# Patient Record
Sex: FEMALE | Race: WHITE | HISPANIC OR LATINO | Employment: OTHER | URBAN - METROPOLITAN AREA
[De-identification: names, ages, dates, MRNs, and addresses within clinical notes are randomized per-mention and may not be internally consistent; named-entity substitution may affect disease eponyms.]

---

## 2023-09-12 ENCOUNTER — HOSPITAL ENCOUNTER (OUTPATIENT)
Dept: DATA CONVERSION | Facility: HOSPITAL | Age: 68
End: 2023-09-12

## 2023-09-12 DIAGNOSIS — R07.9 CHEST PAIN, UNSPECIFIED: ICD-10-CM

## 2023-09-12 DIAGNOSIS — R06.02 SHORTNESS OF BREATH: ICD-10-CM

## 2023-10-17 ENCOUNTER — HOSPITAL ENCOUNTER (OUTPATIENT)
Dept: RADIOLOGY | Facility: HOSPITAL | Age: 68
Discharge: HOME | End: 2023-10-17
Payer: MEDICARE

## 2023-10-17 ENCOUNTER — HOSPITAL ENCOUNTER (OUTPATIENT)
Dept: CARDIOLOGY | Facility: HOSPITAL | Age: 68
Discharge: HOME | End: 2023-10-17
Payer: MEDICARE

## 2023-10-17 DIAGNOSIS — R06.00 DYSPNEA, UNSPECIFIED TYPE: ICD-10-CM

## 2023-10-17 DIAGNOSIS — R07.9 CHEST PAIN, UNSPECIFIED: ICD-10-CM

## 2023-10-17 DIAGNOSIS — R06.02 SHORTNESS OF BREATH: ICD-10-CM

## 2023-10-17 PROCEDURE — 93306 TTE W/DOPPLER COMPLETE: CPT | Performed by: INTERNAL MEDICINE

## 2023-10-17 PROCEDURE — 93306 TTE W/DOPPLER COMPLETE: CPT

## 2023-10-17 PROCEDURE — 75571 CT HRT W/O DYE W/CA TEST: CPT

## 2023-10-18 LAB — EJECTION FRACTION APICAL 4 CHAMBER: 67.7

## 2023-11-14 ENCOUNTER — OFFICE VISIT (OUTPATIENT)
Dept: CARDIOLOGY | Facility: CLINIC | Age: 68
End: 2023-11-14
Payer: MEDICARE

## 2023-11-14 VITALS
TEMPERATURE: 98.6 F | BODY MASS INDEX: 26.52 KG/M2 | DIASTOLIC BLOOD PRESSURE: 67 MMHG | WEIGHT: 165 LBS | SYSTOLIC BLOOD PRESSURE: 117 MMHG | RESPIRATION RATE: 18 BRPM | HEART RATE: 64 BPM | HEIGHT: 66 IN | OXYGEN SATURATION: 98 %

## 2023-11-14 DIAGNOSIS — E78.2 HYPERLIPEMIA, MIXED: ICD-10-CM

## 2023-11-14 DIAGNOSIS — R06.02 SOB (SHORTNESS OF BREATH): Primary | ICD-10-CM

## 2023-11-14 DIAGNOSIS — I10 HTN (HYPERTENSION): ICD-10-CM

## 2023-11-14 DIAGNOSIS — J44.9 COPD (CHRONIC OBSTRUCTIVE PULMONARY DISEASE) (MULTI): ICD-10-CM

## 2023-11-14 PROBLEM — R06.00 DYSPNEA: Status: ACTIVE | Noted: 2023-11-14

## 2023-11-14 PROBLEM — R07.9 CHEST PAIN: Status: ACTIVE | Noted: 2023-11-14

## 2023-11-14 PROBLEM — E78.5 HYPERLIPIDEMIA: Status: ACTIVE | Noted: 2023-11-14

## 2023-11-14 PROCEDURE — 3074F SYST BP LT 130 MM HG: CPT | Performed by: INTERNAL MEDICINE

## 2023-11-14 PROCEDURE — 1036F TOBACCO NON-USER: CPT | Performed by: INTERNAL MEDICINE

## 2023-11-14 PROCEDURE — 3078F DIAST BP <80 MM HG: CPT | Performed by: INTERNAL MEDICINE

## 2023-11-14 PROCEDURE — 1126F AMNT PAIN NOTED NONE PRSNT: CPT | Performed by: INTERNAL MEDICINE

## 2023-11-14 PROCEDURE — 99214 OFFICE O/P EST MOD 30 MIN: CPT | Performed by: INTERNAL MEDICINE

## 2023-11-14 PROCEDURE — 1159F MED LIST DOCD IN RCRD: CPT | Performed by: INTERNAL MEDICINE

## 2023-11-14 RX ORDER — METOPROLOL SUCCINATE 25 MG/1
25 TABLET, EXTENDED RELEASE ORAL DAILY
Qty: 90 TABLET | Refills: 3 | Status: SHIPPED | OUTPATIENT
Start: 2023-11-14 | End: 2024-11-13

## 2023-11-14 RX ORDER — ISOSORBIDE MONONITRATE 30 MG/1
30 TABLET, EXTENDED RELEASE ORAL DAILY
Qty: 90 TABLET | Refills: 3 | Status: SHIPPED | OUTPATIENT
Start: 2023-11-14 | End: 2024-11-13

## 2023-11-14 RX ORDER — ISOSORBIDE MONONITRATE 30 MG/1
30 TABLET, EXTENDED RELEASE ORAL DAILY
COMMUNITY
End: 2023-11-14 | Stop reason: SDUPTHER

## 2023-11-14 RX ORDER — ROSUVASTATIN CALCIUM 20 MG/1
20 TABLET, COATED ORAL DAILY
COMMUNITY
End: 2023-11-14 | Stop reason: SDUPTHER

## 2023-11-14 RX ORDER — METOPROLOL SUCCINATE 25 MG/1
25 TABLET, EXTENDED RELEASE ORAL DAILY
COMMUNITY
End: 2023-11-14 | Stop reason: SDUPTHER

## 2023-11-14 RX ORDER — NITROFURANTOIN 25; 75 MG/1; MG/1
100 CAPSULE ORAL 2 TIMES DAILY
COMMUNITY
Start: 2023-11-10 | End: 2023-11-15

## 2023-11-14 RX ORDER — PHENAZOPYRIDINE HYDROCHLORIDE 200 MG/1
200 TABLET, FILM COATED ORAL 2 TIMES DAILY PRN
COMMUNITY
Start: 2023-11-10

## 2023-11-14 RX ORDER — IBUPROFEN 600 MG/1
600 TABLET ORAL EVERY 8 HOURS PRN
COMMUNITY
Start: 2023-10-10

## 2023-11-14 RX ORDER — ISOSORBIDE MONONITRATE 30 MG/1
30 TABLET, EXTENDED RELEASE ORAL DAILY
COMMUNITY
Start: 2023-08-22 | End: 2023-11-14 | Stop reason: SDUPTHER

## 2023-11-14 RX ORDER — ROSUVASTATIN CALCIUM 20 MG/1
20 TABLET, COATED ORAL DAILY
Qty: 90 TABLET | Refills: 3 | Status: SHIPPED | OUTPATIENT
Start: 2023-11-14 | End: 2024-11-13

## 2023-11-14 ASSESSMENT — PATIENT HEALTH QUESTIONNAIRE - PHQ9
2. FEELING DOWN, DEPRESSED OR HOPELESS: NOT AT ALL
1. LITTLE INTEREST OR PLEASURE IN DOING THINGS: NOT AT ALL
SUM OF ALL RESPONSES TO PHQ9 QUESTIONS 1 AND 2: 0

## 2023-11-14 ASSESSMENT — PAIN SCALES - GENERAL: PAINLEVEL: 0-NO PAIN

## 2023-11-14 NOTE — PROGRESS NOTES
History of present illness:  Patient returns to my office for follow-up.  He still complaining of shortness of breath with moderate activity.  Denies any chest pain dizziness or syncope.      Past Medical History:   Diagnosis Date    Chest pain 11/14/2023    Hyperlipidemia 11/14/2023       No past surgical history on file.    Not on File         No family history on file.    Patient's Medications   New Prescriptions    No medications on file   Previous Medications    IBUPROFEN 600 MG TABLET    Take 1 tablet (600 mg) by mouth every 8 hours if needed for moderate pain (4 - 6).    ISOSORBIDE MONONITRATE ER (IMDUR) 30 MG 24 HR TABLET    Take 1 tablet (30 mg) by mouth once daily.    METOPROLOL SUCCINATE XL (TOPROL-XL) 25 MG 24 HR TABLET    Take 1 tablet (25 mg) by mouth once daily.    NITROFURANTOIN, MACROCRYSTAL-MONOHYDRATE, (MACROBID) 100 MG CAPSULE    Take 1 capsule (100 mg) by mouth 2 times a day.    PHENAZOPYRIDINE (PYRIDIUM) 200 MG TABLET    Take 1 tablet (200 mg) by mouth 2 times a day as needed (Pain).    ROSUVASTATIN (CRESTOR) 20 MG TABLET    Take 1 tablet (20 mg) by mouth once daily.   Modified Medications    No medications on file   Discontinued Medications    ISOSORBIDE MONONITRATE ER (IMDUR) 30 MG 24 HR TABLET    Take 1 tablet (30 mg) by mouth once daily.    METOPROLOL SUCCINATE XL (TOPROL-XL) 25 MG 24 HR TABLET    Take 1 tablet (25 mg) by mouth once daily.    ROSUVASTATIN (CRESTOR) 20 MG TABLET    Take 1 tablet (20 mg) by mouth once daily.       Objective   Physical Exam  General: Patient in no acute distress   HEENT: Atraumatic normocephalic.  Neck: Supple, jugular venous pressure within normal limit.  No bruits  Lungs: Clear to auscultation bilaterally  Cardiovascular: Regular rate and rhythm, normal heart sounds, no murmurs rubs or gallops  Abdomen: Soft nontender nondistended.  Normal bowel sounds.  Extremities: Warm to touch, no edema.    Lab Review   Hospital Outpatient Visit on 10/17/2023   Component  Date Value    LV A4C EF 10/17/2023 67.7         Assessment/Plan   Patient Active Problem List   Diagnosis    Chest pain    Dyspnea    Hyperlipidemia      This is a very pleasant 68-year-old female moved to [place] recently. Used to live in [place]. She was diagnosed of heart failure according to her. She told me that her cardiologist told her that her heart is stiff. She underwent echo and stress test 2 years ago in [place] I do not have the results of this testing. Patient has been feeling overall okay. She still complaining of shortness of breath with moderate activity. Reports chest tightness also with moderate activity. Denies having dizziness or lightheadedness. Denies having syncope or sustained palpitations. She did have palpitations in the past and she felt better with beta-blockers. At this point I will arrange for 2D echo to assess patient's ejection fraction and will obtain also coronary calcium score to see if she has significant buildup of calcium in her arteries. Meanwhile we will switch her from simvastatin to rosuvastatin 20 mg oral daily we will continue metoprolol. We will stop fenofibrate and obtain lipid panel. We will also start on isosorbide and reassess.    Patient returns to my office for follow-up.  He still complaining of shortness of breath with moderate activity.  Denies any chest pain dizziness or syncope.  Underwent 2D echo that showed normal ejection fraction no major valve abnormalities.  She also underwent coronary calcium score which showed 0 calcium score.  On her physical examination she still have expiratory wheezing.  Will refer patient for pulmonary evaluation at the [facility].  Otherwise we will follow-up in 6 months.  Tolerating her current medications.    Kecia Wall MD

## 2023-11-27 PROBLEM — I10 HTN (HYPERTENSION): Status: ACTIVE | Noted: 2023-11-27

## 2023-11-27 PROBLEM — E78.2 HYPERLIPEMIA, MIXED: Status: ACTIVE | Noted: 2023-11-14

## 2024-04-17 ENCOUNTER — TELEPHONE (OUTPATIENT)
Dept: CARDIOLOGY | Facility: CLINIC | Age: 69
End: 2024-04-17
Payer: MEDICARE

## 2024-05-14 ENCOUNTER — APPOINTMENT (OUTPATIENT)
Dept: CARDIOLOGY | Facility: CLINIC | Age: 69
End: 2024-05-14
Payer: MEDICARE

## 2024-06-24 ENCOUNTER — APPOINTMENT (OUTPATIENT)
Dept: CARDIOLOGY | Facility: CLINIC | Age: 69
End: 2024-06-24
Payer: MEDICARE

## 2024-07-10 ENCOUNTER — APPOINTMENT (OUTPATIENT)
Dept: CARDIOLOGY | Facility: CLINIC | Age: 69
End: 2024-07-10
Payer: MEDICARE

## 2024-07-10 VITALS
SYSTOLIC BLOOD PRESSURE: 125 MMHG | TEMPERATURE: 98.9 F | WEIGHT: 173 LBS | DIASTOLIC BLOOD PRESSURE: 66 MMHG | HEART RATE: 56 BPM | BODY MASS INDEX: 27.8 KG/M2 | HEIGHT: 66 IN | OXYGEN SATURATION: 96 % | RESPIRATION RATE: 18 BRPM

## 2024-07-10 DIAGNOSIS — I10 PRIMARY HYPERTENSION: ICD-10-CM

## 2024-07-10 DIAGNOSIS — E78.2 HYPERLIPEMIA, MIXED: Primary | ICD-10-CM

## 2024-07-10 DIAGNOSIS — R07.2 PRECORDIAL PAIN: ICD-10-CM

## 2024-07-10 PROCEDURE — 1159F MED LIST DOCD IN RCRD: CPT | Performed by: INTERNAL MEDICINE

## 2024-07-10 PROCEDURE — 1126F AMNT PAIN NOTED NONE PRSNT: CPT | Performed by: INTERNAL MEDICINE

## 2024-07-10 PROCEDURE — 3078F DIAST BP <80 MM HG: CPT | Performed by: INTERNAL MEDICINE

## 2024-07-10 PROCEDURE — 1036F TOBACCO NON-USER: CPT | Performed by: INTERNAL MEDICINE

## 2024-07-10 PROCEDURE — 1160F RVW MEDS BY RX/DR IN RCRD: CPT | Performed by: INTERNAL MEDICINE

## 2024-07-10 PROCEDURE — 99214 OFFICE O/P EST MOD 30 MIN: CPT | Performed by: INTERNAL MEDICINE

## 2024-07-10 PROCEDURE — 3074F SYST BP LT 130 MM HG: CPT | Performed by: INTERNAL MEDICINE

## 2024-07-10 ASSESSMENT — LIFESTYLE VARIABLES
HOW MANY STANDARD DRINKS CONTAINING ALCOHOL DO YOU HAVE ON A TYPICAL DAY: PATIENT DOES NOT DRINK
HAVE YOU OR SOMEONE ELSE BEEN INJURED AS A RESULT OF YOUR DRINKING: NO
HOW OFTEN DO YOU HAVE SIX OR MORE DRINKS ON ONE OCCASION: NEVER
HAS A RELATIVE, FRIEND, DOCTOR, OR ANOTHER HEALTH PROFESSIONAL EXPRESSED CONCERN ABOUT YOUR DRINKING OR SUGGESTED YOU CUT DOWN: NO
SKIP TO QUESTIONS 9-10: 1
AUDIT TOTAL SCORE: 0
HOW OFTEN DO YOU HAVE A DRINK CONTAINING ALCOHOL: NEVER
AUDIT-C TOTAL SCORE: 0

## 2024-07-10 ASSESSMENT — PATIENT HEALTH QUESTIONNAIRE - PHQ9
SUM OF ALL RESPONSES TO PHQ9 QUESTIONS 1 AND 2: 0
2. FEELING DOWN, DEPRESSED OR HOPELESS: NOT AT ALL
1. LITTLE INTEREST OR PLEASURE IN DOING THINGS: NOT AT ALL

## 2024-07-10 ASSESSMENT — ENCOUNTER SYMPTOMS
DEPRESSION: 0
LOSS OF SENSATION IN FEET: 0
OCCASIONAL FEELINGS OF UNSTEADINESS: 0

## 2024-07-10 ASSESSMENT — PAIN SCALES - GENERAL: PAINLEVEL: 0-NO PAIN

## 2024-07-10 NOTE — PROGRESS NOTES
History of present illness:  This is a very pleasant 68-year-old female moved to [place] recently. Used to live in [place]. She was diagnosed of heart failure according to her. She told me that her cardiologist told her that her heart is stiff. She underwent echo and stress test 2 years ago in [place] I do not have the results of this testing. Patient has been feeling overall okay.  Patient had a recent calcium score of 0.  Feeling much better.  Denies having chest pain shortness of breath palpitations or dizziness.    Past Medical History:   Diagnosis Date    Chest pain 11/14/2023    Hyperlipidemia 11/14/2023       History reviewed. No pertinent surgical history.    No Known Allergies     reports that she has never smoked. She has never been exposed to tobacco smoke. She has never used smokeless tobacco. She reports that she does not currently use alcohol. She reports that she does not use drugs.    Family History   Problem Relation Name Age of Onset    Diabetes Mother      Heart disease Father      Diabetes Other          SEVERAL BROTHERS AND SISTERS       Patient's Medications   New Prescriptions    No medications on file   Previous Medications    IBUPROFEN 600 MG TABLET    Take 1 tablet (600 mg) by mouth every 8 hours if needed for moderate pain (4 - 6).    ISOSORBIDE MONONITRATE ER (IMDUR) 30 MG 24 HR TABLET    Take 1 tablet (30 mg) by mouth once daily.    METOPROLOL SUCCINATE XL (TOPROL-XL) 25 MG 24 HR TABLET    Take 1 tablet (25 mg) by mouth once daily.    PHENAZOPYRIDINE (PYRIDIUM) 200 MG TABLET    Take 1 tablet (200 mg) by mouth 2 times a day as needed (Pain).    ROSUVASTATIN (CRESTOR) 20 MG TABLET    Take 1 tablet (20 mg) by mouth once daily.   Modified Medications    No medications on file   Discontinued Medications    No medications on file       Objective   Physical Exam  General: Patient in no acute distress   HEENT: Atraumatic normocephalic.  Neck: Supple, jugular venous pressure within  normal limit.  No bruits  Lungs: Clear to auscultation bilaterally  Cardiovascular: Regular rate and rhythm, normal heart sounds, no murmurs rubs or gallops  Abdomen: Soft nontender nondistended.  Normal bowel sounds.  Extremities: Warm to touch, no edema.      Lab Review   No visits with results within 2 Month(s) from this visit.   Latest known visit with results is:   Hospital Outpatient Visit on 10/17/2023   Component Date Value    LV A4C EF 10/17/2023 67.7         Assessment/Plan   Patient Active Problem List   Diagnosis    Chest pain    Dyspnea    Hyperlipemia, mixed    HTN (hypertension)        This is a very pleasant 68-year-old female moved to [place] recently. Used to live in [place]. She was diagnosed of heart failure according to her. She told me that her cardiologist told her that her heart is stiff. She underwent echo and stress test 2 years ago in [place] I do not have the results of this testing. Patient has been feeling overall okay.  Patient had a recent calcium score of 0.  Feeling much better.  Denies having chest pain shortness of breath palpitations or dizziness.  She does have lower extremity edema that has been well-controlled with compression stocking likely secondary to venous stasis.  She stable from my standpoint.  Recommend to monitor for now reviewed her labs LDL at target as well as her renal function stable continue current medications we will follow-up in a year     Kecia Wall MD

## 2024-11-19 ENCOUNTER — TELEPHONE (OUTPATIENT)
Dept: CARDIOLOGY | Facility: CLINIC | Age: 69
End: 2024-11-19
Payer: MEDICARE

## 2024-11-19 NOTE — TELEPHONE ENCOUNTER
Patient was [facility] for low heart rate they were recommending her get a heart monitor for low heart would you like me to register her for a zoll monitor?

## 2024-11-21 ENCOUNTER — APPOINTMENT (OUTPATIENT)
Dept: RADIOLOGY | Facility: HOSPITAL | Age: 69
End: 2024-11-21
Payer: MEDICARE

## 2024-11-21 ENCOUNTER — APPOINTMENT (OUTPATIENT)
Dept: CARDIOLOGY | Facility: HOSPITAL | Age: 69
End: 2024-11-21
Payer: MEDICARE

## 2024-11-21 ENCOUNTER — HOSPITAL ENCOUNTER (EMERGENCY)
Facility: HOSPITAL | Age: 69
Discharge: HOME | End: 2024-11-21
Attending: EMERGENCY MEDICINE
Payer: MEDICARE

## 2024-11-21 VITALS
WEIGHT: 174.16 LBS | TEMPERATURE: 97.9 F | DIASTOLIC BLOOD PRESSURE: 75 MMHG | RESPIRATION RATE: 16 BRPM | OXYGEN SATURATION: 95 % | SYSTOLIC BLOOD PRESSURE: 128 MMHG | BODY MASS INDEX: 25.8 KG/M2 | HEIGHT: 69 IN | HEART RATE: 59 BPM

## 2024-11-21 DIAGNOSIS — R53.1 GENERALIZED WEAKNESS: Primary | ICD-10-CM

## 2024-11-21 LAB
ALBUMIN SERPL BCP-MCNC: 4.7 G/DL (ref 3.4–5)
ALP SERPL-CCNC: 76 U/L (ref 33–136)
ALT SERPL W P-5'-P-CCNC: 22 U/L (ref 7–45)
ANION GAP SERPL CALCULATED.3IONS-SCNC: 10 MMOL/L (ref 10–20)
APPEARANCE UR: CLEAR
AST SERPL W P-5'-P-CCNC: 33 U/L (ref 9–39)
ATRIAL RATE: 58 BPM
BASOPHILS # BLD AUTO: 0.02 X10*3/UL (ref 0–0.1)
BASOPHILS NFR BLD AUTO: 0.3 %
BILIRUB SERPL-MCNC: 1.2 MG/DL (ref 0–1.2)
BILIRUB UR STRIP.AUTO-MCNC: NEGATIVE MG/DL
BUN SERPL-MCNC: 13 MG/DL (ref 6–23)
CALCIUM SERPL-MCNC: 10.5 MG/DL (ref 8.6–10.3)
CARDIAC TROPONIN I PNL SERPL HS: 3 NG/L (ref 0–13)
CARDIAC TROPONIN I PNL SERPL HS: <3 NG/L (ref 0–13)
CHLORIDE SERPL-SCNC: 102 MMOL/L (ref 98–107)
CO2 SERPL-SCNC: 29 MMOL/L (ref 21–32)
COLOR UR: COLORLESS
CREAT SERPL-MCNC: 0.81 MG/DL (ref 0.5–1.05)
EGFRCR SERPLBLD CKD-EPI 2021: 78 ML/MIN/1.73M*2
EOSINOPHIL # BLD AUTO: 0.16 X10*3/UL (ref 0–0.4)
EOSINOPHIL NFR BLD AUTO: 2 %
ERYTHROCYTE [DISTWIDTH] IN BLOOD BY AUTOMATED COUNT: 13.2 % (ref 11.5–14.5)
FLUAV RNA RESP QL NAA+PROBE: NOT DETECTED
FLUBV RNA RESP QL NAA+PROBE: NOT DETECTED
GLUCOSE SERPL-MCNC: 87 MG/DL (ref 74–99)
GLUCOSE UR STRIP.AUTO-MCNC: NORMAL MG/DL
HCT VFR BLD AUTO: 46.2 % (ref 36–46)
HGB BLD-MCNC: 15.3 G/DL (ref 12–16)
IMM GRANULOCYTES # BLD AUTO: 0.01 X10*3/UL (ref 0–0.5)
IMM GRANULOCYTES NFR BLD AUTO: 0.1 % (ref 0–0.9)
KETONES UR STRIP.AUTO-MCNC: NEGATIVE MG/DL
LEUKOCYTE ESTERASE UR QL STRIP.AUTO: NEGATIVE
LYMPHOCYTES # BLD AUTO: 3.16 X10*3/UL (ref 0.8–3)
LYMPHOCYTES NFR BLD AUTO: 40 %
MCH RBC QN AUTO: 29 PG (ref 26–34)
MCHC RBC AUTO-ENTMCNC: 33.1 G/DL (ref 32–36)
MCV RBC AUTO: 88 FL (ref 80–100)
MONOCYTES # BLD AUTO: 0.76 X10*3/UL (ref 0.05–0.8)
MONOCYTES NFR BLD AUTO: 9.6 %
NEUTROPHILS # BLD AUTO: 3.79 X10*3/UL (ref 1.6–5.5)
NEUTROPHILS NFR BLD AUTO: 48 %
NITRITE UR QL STRIP.AUTO: NEGATIVE
NRBC BLD-RTO: 0 /100 WBCS (ref 0–0)
P AXIS: -1 DEGREES
P OFFSET: 203 MS
P ONSET: 152 MS
PH UR STRIP.AUTO: 6.5 [PH]
PLATELET # BLD AUTO: 293 X10*3/UL (ref 150–450)
POTASSIUM SERPL-SCNC: 4.4 MMOL/L (ref 3.5–5.3)
PR INTERVAL: 146 MS
PROT SERPL-MCNC: 8.2 G/DL (ref 6.4–8.2)
PROT UR STRIP.AUTO-MCNC: NEGATIVE MG/DL
Q ONSET: 225 MS
QRS COUNT: 10 BEATS
QRS DURATION: 78 MS
QT INTERVAL: 426 MS
QTC CALCULATION(BAZETT): 418 MS
QTC FREDERICIA: 421 MS
R AXIS: -8 DEGREES
RBC # BLD AUTO: 5.28 X10*6/UL (ref 4–5.2)
RBC # UR STRIP.AUTO: NEGATIVE /UL
SARS-COV-2 RNA RESP QL NAA+PROBE: NOT DETECTED
SODIUM SERPL-SCNC: 137 MMOL/L (ref 136–145)
SP GR UR STRIP.AUTO: 1
T AXIS: 26 DEGREES
T OFFSET: 438 MS
UROBILINOGEN UR STRIP.AUTO-MCNC: NORMAL MG/DL
VENTRICULAR RATE: 58 BPM
WBC # BLD AUTO: 7.9 X10*3/UL (ref 4.4–11.3)

## 2024-11-21 PROCEDURE — 84484 ASSAY OF TROPONIN QUANT: CPT

## 2024-11-21 PROCEDURE — 99285 EMERGENCY DEPT VISIT HI MDM: CPT | Mod: 25

## 2024-11-21 PROCEDURE — 70450 CT HEAD/BRAIN W/O DYE: CPT | Performed by: RADIOLOGY

## 2024-11-21 PROCEDURE — 85025 COMPLETE CBC W/AUTO DIFF WBC: CPT

## 2024-11-21 PROCEDURE — 87636 SARSCOV2 & INF A&B AMP PRB: CPT

## 2024-11-21 PROCEDURE — 36415 COLL VENOUS BLD VENIPUNCTURE: CPT

## 2024-11-21 PROCEDURE — 71046 X-RAY EXAM CHEST 2 VIEWS: CPT | Performed by: RADIOLOGY

## 2024-11-21 PROCEDURE — 81003 URINALYSIS AUTO W/O SCOPE: CPT

## 2024-11-21 PROCEDURE — 93005 ELECTROCARDIOGRAM TRACING: CPT

## 2024-11-21 PROCEDURE — 80053 COMPREHEN METABOLIC PANEL: CPT

## 2024-11-21 PROCEDURE — 71046 X-RAY EXAM CHEST 2 VIEWS: CPT

## 2024-11-21 PROCEDURE — 70450 CT HEAD/BRAIN W/O DYE: CPT

## 2024-11-21 ASSESSMENT — COLUMBIA-SUICIDE SEVERITY RATING SCALE - C-SSRS
6. HAVE YOU EVER DONE ANYTHING, STARTED TO DO ANYTHING, OR PREPARED TO DO ANYTHING TO END YOUR LIFE?: NO
2. HAVE YOU ACTUALLY HAD ANY THOUGHTS OF KILLING YOURSELF?: NO
1. IN THE PAST MONTH, HAVE YOU WISHED YOU WERE DEAD OR WISHED YOU COULD GO TO SLEEP AND NOT WAKE UP?: NO

## 2024-11-21 ASSESSMENT — PAIN SCALES - GENERAL: PAINLEVEL_OUTOF10: 0 - NO PAIN

## 2024-11-21 ASSESSMENT — PAIN - FUNCTIONAL ASSESSMENT: PAIN_FUNCTIONAL_ASSESSMENT: 0-10

## 2024-11-21 NOTE — ED PROVIDER NOTES
HPI   Chief Complaint   Patient presents with    Weakness, Gen     Pt came in for feeling weak starting this morning and was seen at [facility] for two syncopal episodes Saturday, was on metoprolol but was taken off due to her syncope and low bp Saturday, denies chest pain sob, no other significant medical hx        Patient is a 69-year-old female with past medical history of HTN, HLD presenting with concerns for weakness.  Report is that she was in the hospital from Saturday until Tuesday due to recurrent syncopal episodes.  An extensive workup was done and [facility] states that the likely cause is patient's metoprolol.  She has been discontinued from the metoprolol.  Family at bedside states that patient has just appeared more fatigued but has not had any syncopal episodes or falls since the hospitalization.  Patient denies fevers, chills, cough, sore throat, runny nose, chest pain, shortness of breath, abdominal pain, nausea, vomiting, diarrhea or urinary complaints.              Patient History   Past Medical History:   Diagnosis Date    Chest pain 11/14/2023    Hyperlipidemia 11/14/2023     No past surgical history on file.  Family History   Problem Relation Name Age of Onset    Diabetes Mother      Heart disease Father      Diabetes Other          SEVERAL BROTHERS AND SISTERS     Social History     Tobacco Use    Smoking status: Never     Passive exposure: Never    Smokeless tobacco: Never   Substance Use Topics    Alcohol use: Not Currently    Drug use: Never       Physical Exam   ED Triage Vitals [11/21/24 1229]   Temperature Heart Rate Respirations BP   36.6 °C (97.9 °F) 59 19 141/73      Pulse Ox Temp Source Heart Rate Source Patient Position   95 % Temporal Monitor --      BP Location FiO2 (%)     -- --       Physical Exam  Vitals and nursing note reviewed.   Constitutional:       Appearance: She is well-developed.      Comments: Awake, laying in examination bed   HENT:      Head: Normocephalic and atraumatic.       Nose: Nose normal.      Mouth/Throat:      Mouth: Mucous membranes are moist.      Pharynx: Oropharynx is clear.   Eyes:      Extraocular Movements: Extraocular movements intact.      Conjunctiva/sclera: Conjunctivae normal.      Pupils: Pupils are equal, round, and reactive to light.   Cardiovascular:      Rate and Rhythm: Normal rate and regular rhythm.      Pulses: Normal pulses.      Heart sounds: Normal heart sounds. No murmur heard.  Pulmonary:      Effort: Pulmonary effort is normal. No respiratory distress.      Breath sounds: Normal breath sounds.   Abdominal:      General: Abdomen is flat.      Palpations: Abdomen is soft.      Tenderness: There is no abdominal tenderness.   Musculoskeletal:         General: No swelling. Normal range of motion.      Cervical back: Normal range of motion and neck supple.   Skin:     General: Skin is warm and dry.      Capillary Refill: Capillary refill takes less than 2 seconds.   Neurological:      General: No focal deficit present.      Mental Status: She is alert and oriented to person, place, and time.      Comments: Awake, alert and oriented x 3. Power intact in the upper and lower extremities. Sensation is intact to light touch in the upper and lower extremities. Cranial Nerves 2-12 are intact. Patella DTRs intact. Finger-to-nose intact. No truncal ataxia.   Psychiatric:         Mood and Affect: Mood normal.         Behavior: Behavior normal.           ED Course & MDM   Diagnoses as of 11/22/24 1057   Generalized weakness                 No data recorded     Jason Coma Scale Score: 15 (11/21/24 1232 : Abdullahi Raphael, RN)                           Medical Decision Making  Patient is a 69-year-old female with past medical history of HTN, HLD presenting with concerns for weakness.  Lab work, urine, imaging ordered.  Condition considered include but are not limited: Pneumonia, viral illness, anemia, UTI.    I saw this patient in conjunction with Dr. Castro.  CBC is  without leukocytosis or anemia.  CMP without significant electrolyte abnormality or renal impairment.  UA with micro is without infection.  Viral swabs are negative.  Troponin within normal limits.  Chest x-ray without acute cardiopulmonary process.  Head CT without acute findings.  Patient successfully passed trial ambulation.    I believe this patient is at low risk for complication, and a disposition of discharge is acceptable.  Return to the Emergency Department if new or worsening symptoms including headache, fever, chills, chest pain, shortness of breath, syncope, near syncope, abdominal pain, nausea, vomiting,  diarrhea, or worsening pain.  Educated patient to utilize the walker she has at home until she feels more confident.  Encouraged her to follow with primary care in the next several days.  Patient is agreeable to this.  Did discuss that her fatigue could be related to the fact that she was in the hospital for several days and it requires multiple days out of the hospital to fully recover.    Portions of this note made with Dragon software, please be mindful of potential grammatical errors.        Labs Reviewed   CBC WITH AUTO DIFFERENTIAL - Abnormal       Result Value    WBC 7.9      nRBC 0.0      RBC 5.28 (*)     Hemoglobin 15.3      Hematocrit 46.2 (*)     MCV 88      MCH 29.0      MCHC 33.1      RDW 13.2      Platelets 293      Neutrophils % 48.0      Immature Granulocytes %, Automated 0.1      Lymphocytes % 40.0      Monocytes % 9.6      Eosinophils % 2.0      Basophils % 0.3      Neutrophils Absolute 3.79      Immature Granulocytes Absolute, Automated 0.01      Lymphocytes Absolute 3.16 (*)     Monocytes Absolute 0.76      Eosinophils Absolute 0.16      Basophils Absolute 0.02     COMPREHENSIVE METABOLIC PANEL - Abnormal    Glucose 87      Sodium 137      Potassium 4.4      Chloride 102      Bicarbonate 29      Anion Gap 10      Urea Nitrogen 13      Creatinine 0.81      eGFR 78      Calcium 10.5  (*)     Albumin 4.7      Alkaline Phosphatase 76      Total Protein 8.2      AST 33      Bilirubin, Total 1.2      ALT 22     URINALYSIS WITH REFLEX CULTURE AND MICROSCOPIC - Abnormal    Color, Urine Colorless (*)     Appearance, Urine Clear      Specific Gravity, Urine 1.005      pH, Urine 6.5      Protein, Urine NEGATIVE      Glucose, Urine Normal      Blood, Urine NEGATIVE      Ketones, Urine NEGATIVE      Bilirubin, Urine NEGATIVE      Urobilinogen, Urine Normal      Nitrite, Urine NEGATIVE      Leukocyte Esterase, Urine NEGATIVE     SARS-COV-2 PCR - Normal    Coronavirus 2019, PCR Not Detected      Narrative:     This assay has received FDA Emergency Use Authorization (EUA) and is only authorized for the duration of time that circumstances exist to justify the authorization of the emergency use of in vitro diagnostic tests for the detection of SARS-CoV-2 virus and/or diagnosis of COVID-19 infection under section 564(b)(1) of the Act, 21 U.S.C. 360bbb-3(b)(1). This assay is an in vitro diagnostic nucleic acid amplification test for the qualitative detection of SARS-CoV-2 from nasopharyngeal specimens and has been validated for use at [facility]. Negative results do not preclude COVID-19 infections and should not be used as the sole basis for diagnosis, treatment, or other management decisions.     INFLUENZA A AND B PCR - Normal    Flu A Result Not Detected      Flu B Result Not Detected      Narrative:     This assay is an in vitro diagnostic multiplex nucleic acid amplification test for the detection and discrimination of Influenza A & B from nasopharyngeal specimens, and has been validated for use at [facility]. Negative results do not preclude Influenza A/B infections, and should not be used as the sole basis for diagnosis, treatment, or other management decisions. If Influenza A/B and RSV PCR results are negative, testing for Parainfluenza virus, Adenovirus and  Metapneumovirus is routinely performed for [facility] pediatric oncology and intensive care inpatients, and is available on other patients by placing an add-on request.   SERIAL TROPONIN-INITIAL - Normal    Troponin I, High Sensitivity 3      Narrative:     Less than 99th percentile of normal range cutoff-  Female and children under 18 years old <14 ng/L; Male <21 ng/L: Negative  Repeat testing should be performed if clinically indicated.     Female and children under 18 years old 14-50 ng/L; Male 21-50 ng/L:  Consistent with possible cardiac damage and possible increased clinical   risk. Serial measurements may help to assess extent of myocardial damage.     >50 ng/L: Consistent with cardiac damage, increased clinical risk and  myocardial infarction. Serial measurements may help assess extent of   myocardial damage.      NOTE: Children less than 1 year old may have higher baseline troponin   levels and results should be interpreted in conjunction with the overall   clinical context.     NOTE: Troponin I testing is performed using a different   testing methodology at [facility] than at other   [facility]. Direct result comparisons should only   be made within the same method.   SERIAL TROPONIN, 1 HOUR - Normal    Troponin I, High Sensitivity <3      Narrative:     Less than 99th percentile of normal range cutoff-  Female and children under 18 years old <14 ng/L; Male <21 ng/L: Negative  Repeat testing should be performed if clinically indicated.     Female and children under 18 years old 14-50 ng/L; Male 21-50 ng/L:  Consistent with possible cardiac damage and possible increased clinical   risk. Serial measurements may help to assess extent of myocardial damage.     >50 ng/L: Consistent with cardiac damage, increased clinical risk and  myocardial infarction. Serial measurements may help assess extent of   myocardial damage.      NOTE: Children less than 1 year old may have higher baseline troponin    levels and results should be interpreted in conjunction with the overall   clinical context.     NOTE: Troponin I testing is performed using a different   testing methodology at [facility] than at other   [facility]. Direct result comparisons should only   be made within the same method.   TROPONIN SERIES- (INITIAL, 1 HR)    Narrative:     The following orders were created for panel order Troponin I Series, High Sensitivity (0, 1 HR).  Procedure                               Abnormality         Status                     ---------                               -----------         ------                     Troponin I, High Sensiti...[[id number]]  Normal              Final result               Troponin, High Sensitivi...[[id number]]  Normal              Final result                 Please view results for these tests on the individual orders.   URINALYSIS WITH REFLEX CULTURE AND MICROSCOPIC    Narrative:     The following orders were created for panel order Urinalysis with Reflex Culture and Microscopic.  Procedure                               Abnormality         Status                     ---------                               -----------         ------                     Urinalysis with Reflex C...[[id number]]  Abnormal            Final result               Extra Urine Gray Tube[[id number]]                                                         Please view results for these tests on the individual orders.   EXTRA URINE GRAY TUBE         CT head wo IV contrast   Final Result   No acute intracranial process.                  MACRO:   None.        Signed by: Agusto De Santiago 11/21/2024 1:52 PM   Dictation workstation:   [id number]      XR chest 2 views   Final Result   No acute cardiopulmonary disease.        Signed by: Nolberto Bland 11/21/2024 1:46 PM   Dictation workstation:   [id number]            Procedure  Procedures     Nain Tony PA-C  11/22/24 1059

## 2024-11-27 ENCOUNTER — ANCILLARY PROCEDURE (OUTPATIENT)
Dept: CARDIOLOGY | Facility: CLINIC | Age: 69
End: 2024-11-27
Payer: MEDICARE

## 2024-11-27 DIAGNOSIS — R00.1 BRADYCARDIA: ICD-10-CM

## 2024-11-27 NOTE — TELEPHONE ENCOUNTER
Yes please arrange for monitor for 2 weeks for bradycardia and dizziness per dr littlejohn    Informed patient daughter and ordered monitor

## 2025-01-02 ENCOUNTER — TELEPHONE (OUTPATIENT)
Dept: CARDIOLOGY | Facility: CLINIC | Age: 70
End: 2025-01-02
Payer: MEDICARE

## 2025-02-05 PROCEDURE — 93248 EXT ECG>7D<15D REV&INTERPJ: CPT | Performed by: INTERNAL MEDICINE

## 2025-05-21 DIAGNOSIS — I10 HTN (HYPERTENSION): ICD-10-CM

## 2025-05-23 ENCOUNTER — TELEPHONE (OUTPATIENT)
Dept: CARDIOLOGY | Facility: CLINIC | Age: 70
End: 2025-05-23
Payer: MEDICARE

## 2025-05-23 NOTE — TELEPHONE ENCOUNTER
metoprolol succinate XL (Toprol-XL) 25 mg 24 hr tablet  1 tablet daily 90 day supply  She is out the prescription    [id number] - [place], [place] - [address]

## 2025-05-27 ENCOUNTER — TELEPHONE (OUTPATIENT)
Age: 70
End: 2025-05-27
Payer: MEDICARE

## 2025-05-27 NOTE — TELEPHONE ENCOUNTER
Rx Refill- 90 days   Pharmacy-  [id number] - [place], [place] - [address] Phone: [phone]   Fax: [phone]        Medication-   Dispensed Days Supply Quantity Provider Pharmacy   Metoprol Suc E 25mg Tab Gran 11/08/2024 90 90 each [id number] [place]...         Completely out since Saturday 5/14/25

## 2025-05-29 RX ORDER — METOPROLOL SUCCINATE 25 MG/1
25 TABLET, EXTENDED RELEASE ORAL DAILY
Qty: 90 TABLET | Refills: 3 | Status: SHIPPED | OUTPATIENT
Start: 2025-05-29 | End: 2026-05-24

## 2025-07-09 ENCOUNTER — APPOINTMENT (OUTPATIENT)
Dept: CARDIOLOGY | Facility: CLINIC | Age: 70
End: 2025-07-09
Payer: MEDICARE

## 2025-07-17 ENCOUNTER — APPOINTMENT (OUTPATIENT)
Dept: CARDIOLOGY | Facility: CLINIC | Age: 70
End: 2025-07-17
Payer: MEDICARE

## 2025-07-17 VITALS
SYSTOLIC BLOOD PRESSURE: 118 MMHG | WEIGHT: 173 LBS | RESPIRATION RATE: 16 BRPM | DIASTOLIC BLOOD PRESSURE: 70 MMHG | HEART RATE: 66 BPM | BODY MASS INDEX: 25.55 KG/M2 | OXYGEN SATURATION: 97 %

## 2025-07-17 DIAGNOSIS — E78.2 HYPERLIPEMIA, MIXED: ICD-10-CM

## 2025-07-17 DIAGNOSIS — R07.2 PRECORDIAL PAIN: ICD-10-CM

## 2025-07-17 DIAGNOSIS — I10 HTN (HYPERTENSION): ICD-10-CM

## 2025-07-17 DIAGNOSIS — I10 PRIMARY HYPERTENSION: ICD-10-CM

## 2025-07-17 PROCEDURE — 1159F MED LIST DOCD IN RCRD: CPT | Performed by: INTERNAL MEDICINE

## 2025-07-17 PROCEDURE — 99214 OFFICE O/P EST MOD 30 MIN: CPT | Performed by: INTERNAL MEDICINE

## 2025-07-17 PROCEDURE — 3078F DIAST BP <80 MM HG: CPT | Performed by: INTERNAL MEDICINE

## 2025-07-17 PROCEDURE — 1126F AMNT PAIN NOTED NONE PRSNT: CPT | Performed by: INTERNAL MEDICINE

## 2025-07-17 PROCEDURE — 1160F RVW MEDS BY RX/DR IN RCRD: CPT | Performed by: INTERNAL MEDICINE

## 2025-07-17 PROCEDURE — 3074F SYST BP LT 130 MM HG: CPT | Performed by: INTERNAL MEDICINE

## 2025-07-17 RX ORDER — METOPROLOL SUCCINATE 25 MG/1
25 TABLET, EXTENDED RELEASE ORAL DAILY
Qty: 90 TABLET | Refills: 3 | Status: SHIPPED | OUTPATIENT
Start: 2025-07-17 | End: 2026-07-12

## 2025-07-17 RX ORDER — ISOSORBIDE MONONITRATE 30 MG/1
30 TABLET, EXTENDED RELEASE ORAL DAILY
Qty: 90 TABLET | Refills: 3 | Status: SHIPPED | OUTPATIENT
Start: 2025-07-17 | End: 2026-07-17

## 2025-07-17 RX ORDER — ROSUVASTATIN CALCIUM 20 MG/1
20 TABLET, COATED ORAL DAILY
Qty: 90 TABLET | Refills: 3 | Status: SHIPPED | OUTPATIENT
Start: 2025-07-17 | End: 2026-07-17

## 2025-07-17 ASSESSMENT — LIFESTYLE VARIABLES
HAS A RELATIVE, FRIEND, DOCTOR, OR ANOTHER HEALTH PROFESSIONAL EXPRESSED CONCERN ABOUT YOUR DRINKING OR SUGGESTED YOU CUT DOWN: NO
SKIP TO QUESTIONS 9-10: 1
AUDIT-C TOTAL SCORE: 0
HOW OFTEN DO YOU HAVE SIX OR MORE DRINKS ON ONE OCCASION: NEVER
HAVE YOU OR SOMEONE ELSE BEEN INJURED AS A RESULT OF YOUR DRINKING: NO
AUDIT TOTAL SCORE: 0
HOW MANY STANDARD DRINKS CONTAINING ALCOHOL DO YOU HAVE ON A TYPICAL DAY: PATIENT DOES NOT DRINK
HOW OFTEN DO YOU HAVE A DRINK CONTAINING ALCOHOL: NEVER

## 2025-07-17 ASSESSMENT — ENCOUNTER SYMPTOMS
DEPRESSION: 0
LOSS OF SENSATION IN FEET: 0
OCCASIONAL FEELINGS OF UNSTEADINESS: 0

## 2025-07-17 ASSESSMENT — PAIN SCALES - GENERAL: PAINLEVEL_OUTOF10: 0-NO PAIN

## 2025-07-17 NOTE — PROGRESS NOTES
[facility]    Interventional Cardiology    History of present illness:  This is a very pleasant 70-year-old female moved to [place] recently. Used to live in [place]. She was diagnosed of heart failure according to her. She told me that her cardiologist told her that her heart is stiff. She underwent echo and stress test 3 years ago in [place] I do not have the results of this testing. Patient has been feeling overall okay.  Patient had a recent calcium score of 0.  Patient coronary calcium score was 0 in October 17, 2023.  She had a monitor on November 27, 2024 in my office as a workup for palpitation that showed no major arrhythmias.  Patient returns to my office for follow-up.  Has been feeling overall okay.  Denies having any chest pain or shortness of breath.  No palpitation dizziness lightheadedness or syncope.      Medical History[1]    Surgical History[2]    Allergies[3]     reports that she has never smoked. She has never been exposed to tobacco smoke. She has never used smokeless tobacco. She reports that she does not currently use alcohol. She reports that she does not use drugs.    Family History[4]    Patient's Medications   New Prescriptions    No medications on file   Previous Medications    IBUPROFEN 600 MG TABLET    Take 1 tablet (600 mg) by mouth every 8 hours if needed for moderate pain (4 - 6).    ISOSORBIDE MONONITRATE ER (IMDUR) 30 MG 24 HR TABLET    Take 1 tablet (30 mg) by mouth once daily.    METOPROLOL SUCCINATE XL (TOPROL-XL) 25 MG 24 HR TABLET    Take 1 tablet (25 mg) by mouth once daily.    PHENAZOPYRIDINE (PYRIDIUM) 200 MG TABLET    Take 1 tablet (200 mg) by mouth 2 times a day as needed (Pain).    ROSUVASTATIN (CRESTOR) 20 MG TABLET    Take 1 tablet (20 mg) by mouth once daily.   Modified Medications    No medications on file   Discontinued Medications    No medications on file       Objective   Physical Exam  General:  Patient in no acute distress   HEENT: Atraumatic normocephalic.  Neck: Supple, jugular venous pressure within normal limit.  No bruits  Lungs: Clear to auscultation bilaterally  Cardiovascular: Regular rate and rhythm, normal heart sounds, no murmurs rubs or gallops  Abdomen: Soft nontender nondistended.  Normal bowel sounds.  Extremities: Warm to touch, no edema.    Lab Review   No visits with results within 2 Month(s) from this visit.   Latest known visit with results is:   Admission on 11/21/2024, Discharged on 11/21/2024   Component Date Value    WBC 11/21/2024 7.9     nRBC 11/21/2024 0.0     RBC 11/21/2024 5.28 (H)     Hemoglobin 11/21/2024 15.3     Hematocrit 11/21/2024 46.2 (H)     MCV 11/21/2024 88     MCH 11/21/2024 29.0     MCHC 11/21/2024 33.1     RDW 11/21/2024 13.2     Platelets 11/21/2024 293     Neutrophils % 11/21/2024 48.0     Immature Granulocytes %,* 11/21/2024 0.1     Lymphocytes % 11/21/2024 40.0     Monocytes % 11/21/2024 9.6     Eosinophils % 11/21/2024 2.0     Basophils % 11/21/2024 0.3     Neutrophils Absolute 11/21/2024 3.79     Immature Granulocytes Ab* 11/21/2024 0.01     Lymphocytes Absolute 11/21/2024 3.16 (H)     Monocytes Absolute 11/21/2024 0.76     Eosinophils Absolute 11/21/2024 0.16     Basophils Absolute 11/21/2024 0.02     Glucose 11/21/2024 87     Sodium 11/21/2024 137     Potassium 11/21/2024 4.4     Chloride 11/21/2024 102     Bicarbonate 11/21/2024 29     Anion Gap 11/21/2024 10     Urea Nitrogen 11/21/2024 13     Creatinine 11/21/2024 0.81     eGFR 11/21/2024 78     Calcium 11/21/2024 10.5 (H)     Albumin 11/21/2024 4.7     Alkaline Phosphatase 11/21/2024 76     Total Protein 11/21/2024 8.2     AST 11/21/2024 33     Bilirubin, Total 11/21/2024 1.2     ALT 11/21/2024 22     Ventricular Rate 11/21/2024 58     Atrial Rate 11/21/2024 58     NC Interval 11/21/2024 146     QRS Duration 11/21/2024 78     QT Interval 11/21/2024 426     QTC Calculation(Bazett) 11/21/2024 418     P  Axis 11/21/2024 -1     R Axis 11/21/2024 -8     T [place] 11/21/2024 26     QRS Count 11/21/2024 10     Q Onset 11/21/2024 225     P Onset 11/21/2024 152     P Offset 11/21/2024 203     T Offset 11/21/2024 438     QTC [place] 11/21/2024 421     Coronavirus 2019, PCR 11/21/2024 Not Detected     Flu A Result 11/21/2024 Not Detected     Flu B Result 11/21/2024 Not Detected     Color, Urine 11/21/2024 Colorless (N)     Appearance, Urine 11/21/2024 Clear     Specific Gravity, Urine 11/21/2024 1.005     pH, Urine 11/21/2024 6.5     Protein, Urine 11/21/2024 NEGATIVE     Glucose, Urine 11/21/2024 Normal     Blood, Urine 11/21/2024 NEGATIVE     Ketones, Urine 11/21/2024 NEGATIVE     Bilirubin, Urine 11/21/2024 NEGATIVE     Urobilinogen, Urine 11/21/2024 Normal     Nitrite, Urine 11/21/2024 NEGATIVE     Leukocyte Esterase, Urine 11/21/2024 NEGATIVE     Troponin I, High [place]* 11/21/2024 3     Troponin I, High Sensiti* 11/21/2024 <3         Assessment/Plan   Problem List[5]   This is a very pleasant 70-year-old female moved to [place] recently. Used to live in [place]. She was diagnosed of heart failure according to her. She told me that her cardiologist told her that her heart is stiff. She underwent echo and stress test 3 years ago in [place] I do not have the results of this testing. Patient has been feeling overall okay.  Patient had a recent calcium score of 0.  Patient coronary calcium score was 0 in October 17, 2023.  She had a monitor on November 27, 2024 in my office as a workup for palpitation that showed no major arrhythmias.  Patient returns to my office for follow-up.  Has been feeling overall okay.  Denies having any chest pain or shortness of breath.  No palpitation dizziness lightheadedness or syncope.      Patient with heart failure diastolic dysfunction on optimal medical therapy.  Blood pressure and heart rate well-controlled.  LDL at target.  Reviewed patient labs.  Renal function stable.   Recommend to continue metoprolol and isosorbide in addition of rosuvastatin 20 mg oral daily.  Currently not using any diuretics.  Discussed with her renal salt restrictions as well as fluid restrictions.  Lifestyle modification.  Will follow-up in 12 months.    Kecia Wall MD              [1]   Past Medical History:  Diagnosis Date    Chest pain 11/14/2023    Hyperlipidemia 11/14/2023   [2] History reviewed. No pertinent surgical history.  [3] No Known Allergies  [4]   Family History  Problem Relation Name Age of Onset    Diabetes Mother      Heart disease Father      Diabetes Other          SEVERAL BROTHERS AND SISTERS   [5]   Patient Active Problem List  Diagnosis    Chest pain    Dyspnea    Hyperlipemia, mixed    HTN (hypertension)